# Patient Record
Sex: FEMALE | Employment: FULL TIME | URBAN - METROPOLITAN AREA
[De-identification: names, ages, dates, MRNs, and addresses within clinical notes are randomized per-mention and may not be internally consistent; named-entity substitution may affect disease eponyms.]

---

## 2019-03-26 ENCOUNTER — TELEPHONE (OUTPATIENT)
Dept: SURGERY | Age: 34
End: 2019-03-26

## 2019-03-26 ENCOUNTER — DOCUMENTATION ONLY (OUTPATIENT)
Dept: SURGERY | Age: 34
End: 2019-03-26

## 2019-03-26 ENCOUNTER — OFFICE VISIT (OUTPATIENT)
Dept: SURGERY | Age: 34
End: 2019-03-26

## 2019-03-26 VITALS
HEART RATE: 81 BPM | BODY MASS INDEX: 23.3 KG/M2 | SYSTOLIC BLOOD PRESSURE: 126 MMHG | HEIGHT: 66 IN | WEIGHT: 145 LBS | DIASTOLIC BLOOD PRESSURE: 74 MMHG

## 2019-03-26 DIAGNOSIS — N63.10 BREAST MASS, RIGHT: Primary | ICD-10-CM

## 2019-03-26 RX ORDER — ETONOGESTREL AND ETHINYL ESTRADIOL 11.7; 2.7 MG/1; MG/1
INSERT, EXTENDED RELEASE VAGINAL
COMMUNITY

## 2019-03-26 NOTE — PROGRESS NOTES
[facility]  OFFICE PROCEDURE PROGRESS NOTE        Chart reviewed for the following:   Blade Frey MD, have reviewed the History, Physical and updated the Allergic reactions for Ester performed immediately prior to start of procedure:   Blade Frey MD, have performed the following reviews on [place] prior to the start of the procedure:            * Patient was identified by name and date of birth   * Agreement on procedure being performed was verified  * Risks and Benefits explained to the patient  * Procedure site verified and marked as necessary  * Patient was positioned for comfort  * Consent was signed and verified     Time: 9:45 am      Date of procedure: 3/26/2019    Procedure performed by:  Amanda Torres MD    Provider assisted by:  Naz Chanel RN    Patient assisted by: self    How tolerated by patient: tolerated the procedure well with no complications    Post Procedural Pain Scale: 0 - No Hurt    Comments:   Written and verbal instructions reviewed with and given to patient with her understanding.

## 2019-03-26 NOTE — H&P (VIEW-ONLY)
HISTORY OF PRESENT ILLNESS Shawna Buck is a 34 y.o. female. HPI NEW patient referred by Dr Candido Ballard presents with a RIGHT nipple mass, present for 2 years. The mass occasionally drains clear fluid and gets smaller and then larger. It is painful with pressure. No retraction. OB History Obstetric Comments Menarche 10, LMP 3/5/19, # of children 0, age of 1st delivery n/a, Hysterectomy/oophorectomy no/no, Breast bx no, history of breast feeding no, BCP yes, Hormone therapy no Family history: 
Maternal aunt, breast cancer-diagnosed in her 52's Ultrasound 2 years ago in [place] was normal. 
 
ROS Physical Exam  
Constitutional: She is oriented to person, place, and time. She appears well-developed and well-nourished. Cardiovascular: Normal rate, regular rhythm and normal heart sounds. Pulmonary/Chest: Effort normal and breath sounds normal. Right breast exhibits mass (7 mm mass under the skin of the  medial aspect of the nipple). Right breast exhibits no inverted nipple, no nipple discharge and no skin change. Left breast exhibits no inverted nipple, no mass, no nipple discharge and no skin change. Breasts are symmetrical.  
 
 
Lymphadenopathy:  
  She has no cervical adenopathy. She has no axillary adenopathy. Right: No supraclavicular adenopathy present. Left: No supraclavicular adenopathy present. Neurological: She is alert and oriented to person, place, and time. Skin: Skin is warm and dry. INCISION OF NIPPLE CYST Indication : CYST:  Right nipple Ultrasound Findings: 8mm oval hypoechoic mass with through transmission. Underlying breast tissue is normal, Prep : Alcohol. Anesthesia : Lidocaine plain, 1 cc Procedure:  A 3mm nick was made in the presumed cyst.  No fluid or sebaceous material drain. A solid mass was identified, possibly a papilloma. Dispositiion: will schedule surgical excision History reviewed. No pertinent past medical history. Past Surgical History:  
Procedure Laterality Date  HX HEENT Bilateral   
 LASIK  
 HX WISDOM TEETH EXTRACTION Family History Problem Relation Age of Onset  Breast Cancer Maternal Aunt 50 Social History Tobacco Use  Smoking status: Never Smoker  Smokeless tobacco: Never Used Substance Use Topics  Alcohol use: Yes Frequency: 2-3 times a week Prior to Admission medications Medication Sig Start Date End Date Taking? Authorizing Provider  
ethinyl estradiol-etonogestrel (NUVARING) 0.12-0.015 mg/24 hr vaginal ring by Intravaginal route. Yes Provider, Historical  
  
No Known Allergies ASSESSMENT and PLAN 
  ICD-10-CM ICD-9-CM 1. Breast mass, right N63.10 611.72   
 
RIGHT nipple mass is a solid mass. Will schedule surgical excision. We discussed the possibility that it could interfere with breast feeding. The mass appears to be on the medial aspect of the nipple and hopefully the majority of the nipple and subareolar ducts are lateral to the lesion and will not be injured with surgery.

## 2019-03-26 NOTE — PROGRESS NOTES
HISTORY OF PRESENT ILLNESS  Ale Fernández is a 34 y.o. female. HPI NEW patient referred by Dr Jean Carlos Robertson presents with a RIGHT nipple mass, present for 2 years. The mass occasionally drains clear fluid and gets smaller and then larger. It is painful with pressure. No retraction. OB History   Obstetric Comments   Menarche 10, LMP 3/5/19, # of children 0, age of 1st delivery n/a, Hysterectomy/oophorectomy no/no, Breast bx no, history of breast feeding no, BCP yes, Hormone therapy no     Family history:  Maternal aunt, breast cancer-diagnosed in her 52's    Ultrasound 2 years ago in [place] was normal.    ROS    Physical Exam   Constitutional: She is oriented to person, place, and time. She appears well-developed and well-nourished. Cardiovascular: Normal rate, regular rhythm and normal heart sounds. Pulmonary/Chest: Effort normal and breath sounds normal. Right breast exhibits mass (7 mm mass under the skin of the  medial aspect of the nipple). Right breast exhibits no inverted nipple, no nipple discharge and no skin change. Left breast exhibits no inverted nipple, no mass, no nipple discharge and no skin change. Breasts are symmetrical.       Lymphadenopathy:     She has no cervical adenopathy. She has no axillary adenopathy. Right: No supraclavicular adenopathy present. Left: No supraclavicular adenopathy present. Neurological: She is alert and oriented to person, place, and time. Skin: Skin is warm and dry. INCISION OF NIPPLE CYST  Indication : CYST:  Right nipple   Ultrasound Findings: 8mm oval hypoechoic mass with through transmission. Underlying breast tissue is normal,  Prep : Alcohol. Anesthesia : Lidocaine plain, 1 cc  Procedure:  A 3mm nick was made in the presumed cyst.  No fluid or sebaceous material drain. A solid mass was identified, possibly a papilloma. Dispositiion: will schedule surgical excision  History reviewed. No pertinent past medical history.   Past Surgical History:   Procedure Laterality Date    HX HEENT Bilateral     LASIK    HX WISDOM TEETH EXTRACTION        Family History   Problem Relation Age of Onset    Breast Cancer Maternal Aunt 48     Social History     Tobacco Use    Smoking status: Never Smoker    Smokeless tobacco: Never Used   Substance Use Topics    Alcohol use: Yes     Frequency: 2-3 times a week      Prior to Admission medications    Medication Sig Start Date End Date Taking? Authorizing Provider   ethinyl estradiol-etonogestrel (NUVARING) 0.12-0.015 mg/24 hr vaginal ring by Intravaginal route. Yes Provider, Historical      No Known Allergies    ASSESSMENT and PLAN    ICD-10-CM ICD-9-CM    1. Breast mass, right N63.10 611.72      RIGHT nipple mass is a solid mass. Will schedule surgical excision. We discussed the possibility that it could interfere with breast feeding. The mass appears to be on the medial aspect of the nipple and hopefully the majority of the nipple and subareolar ducts are lateral to the lesion and will not be injured with surgery.

## 2019-03-26 NOTE — PATIENT INSTRUCTIONS

## 2019-03-27 DIAGNOSIS — N63.10 MASS OF BREAST, RIGHT: Primary | ICD-10-CM

## 2019-03-27 RX ORDER — IBUPROFEN 200 MG
200 TABLET ORAL
COMMUNITY

## 2019-03-27 NOTE — TELEPHONE ENCOUNTER
SURGERY SCHEDULED AT [facility] ON 4-3-19 AT 11 AM  PATIENT TO ARRIVE AT 9:30 AM TO REGISTER ON THE  2ND FLOOR; NPO AFTER MIDNIGHT THE NIGHT BEFORE;  NOTHING ON THE SKIN; WILL NEED A . PATIENT HAS BEEN GIVEN INSTRUCTIONS. THE PATIENT LIVES IN [place] AND WILL HAVE   HER POST OP CHECK THERE.

## 2019-03-27 NOTE — PERIOP NOTES
[address]                  
[address] [facility], [id number] [place] [address] MAIN OR                                  [phone] MAIN PRE OP                          [phone]                                                                                AMBULATORY PRE OP          [phone] PRE-ADMISSION TESTING    21  Surgery Date: Wednesday 4/3/19 Is surgery arrival time given by surgeon? Yes - 9:30am 
 
 
INSTRUCTIONS BEFORE YOUR SURGERY When You 
Arrive Arrive at the 2nd [address] [facility] on the day of your surgery Have your insurance card, photo ID, and any copayment (if needed) Food 
 and  
Drink NO food or drink after midnight the night before surgery This means NO water, gum, mints, coffee, juice, etc. 
No alcohol (beer, wine, liquor) 24 hours before and after surgery Medications to TAKE Morning of Surgery MEDICATIONS TO TAKE THE MORNING OF SURGERY WITH A SIP OF WATER:  
? none Medications To 
STOP      7 days before surgery ? Non-Steroidal anti-inflammatory Drugs (NSAID's): for example, Ibuprofen (Advil, Motrin), Naproxen (Aleve) ? Aspirin, if taking for pain ? Herbal supplements, vitamins, and fish oil 
? Other: 
(Pain medications not listed above, including Tylenol may be taken) Blood Thinners ? Bathing Clothing Jewelry Valuables ? If you shower the morning of surgery, please do not apply anything to your skin (lotions, powders, deodorant, or makeup, especially mascara) ? ? Do not shave or trim anywhere 24 hours before surgery ? Wear your hair loose or down; no pony-tails, buns, or metal hair clips ? Wear loose, comfortable, clean clothes ? Wear glasses instead of contacts ? Leave money, valuables, and jewelry, including body piercings, at home Going Home       or Spending the Night ?  SAME-DAY SURGERY: You must have a responsible adult drive you home and stay with you 24 hours after surgery ? ADMITS: If your doctor is keeping you into the hospital after surgery, leave personal belongings/luggage in your car until you have a hospital room number. Hospital discharge time is 12 noon Drivers must be here before 12 noon unless you are told differently Special Instructions Free  parking 7am-5pm 
  
 
 
Follow all instructions so your surgery wont be cancelled. Please, be on time. If a situation occurs and you are delayed the day of surgery, call [phone] or          [phone]. If your physical condition changes (like a fever, cold, flu, etc.) call your surgeon. The patient was contacted  via phone. Home medication reviewed and verified during PAT appointment. The patient verbalizes understanding of all instructions and does not  need reinforcement.

## 2019-04-02 ENCOUNTER — ANESTHESIA EVENT (OUTPATIENT)
Dept: SURGERY | Age: 34
End: 2019-04-02
Payer: COMMERCIAL

## 2019-04-03 ENCOUNTER — HOSPITAL ENCOUNTER (OUTPATIENT)
Age: 34
Setting detail: OUTPATIENT SURGERY
Discharge: HOME | End: 2019-04-03
Attending: SURGERY | Admitting: SURGERY
Payer: COMMERCIAL

## 2019-04-03 ENCOUNTER — ANESTHESIA (OUTPATIENT)
Dept: SURGERY | Age: 34
End: 2019-04-03
Payer: COMMERCIAL

## 2019-04-03 VITALS
BODY MASS INDEX: 23.14 KG/M2 | OXYGEN SATURATION: 100 % | RESPIRATION RATE: 15 BRPM | TEMPERATURE: 97.5 F | HEART RATE: 78 BPM | DIASTOLIC BLOOD PRESSURE: 71 MMHG | HEIGHT: 66 IN | WEIGHT: 143.96 LBS | SYSTOLIC BLOOD PRESSURE: 122 MMHG

## 2019-04-03 DIAGNOSIS — N63.10 MASS OF BREAST, RIGHT: ICD-10-CM

## 2019-04-03 DIAGNOSIS — Z98.890 S/P BREAST BIOPSY: Primary | ICD-10-CM

## 2019-04-03 LAB — HCG UR QL: NEGATIVE

## 2019-04-03 PROCEDURE — 77030031139 HC SUT VCRL2 J&J -A: Performed by: SURGERY

## 2019-04-03 PROCEDURE — 77030039266 HC ADH SKN EXOFIN S2SG -A: Performed by: SURGERY

## 2019-04-03 PROCEDURE — 76210000040 HC AMBSU PH I REC FIRST 0.5 HR: Performed by: SURGERY

## 2019-04-03 PROCEDURE — 74011250636 HC RX REV CODE- 250/636

## 2019-04-03 PROCEDURE — 74011250636 HC RX REV CODE- 250/636: Performed by: ANESTHESIOLOGY

## 2019-04-03 PROCEDURE — 76030000000 HC AMB SURG OR TIME 0.5 TO 1: Performed by: SURGERY

## 2019-04-03 PROCEDURE — 74011000250 HC RX REV CODE- 250: Performed by: SURGERY

## 2019-04-03 PROCEDURE — 76210000050 HC AMBSU PH II REC 0.5 TO 1 HR: Performed by: SURGERY

## 2019-04-03 PROCEDURE — 77030018836 HC SOL IRR NACL ICUM -A: Performed by: SURGERY

## 2019-04-03 PROCEDURE — 77030020782 HC GWN BAIR PAWS FLX 3M -B

## 2019-04-03 PROCEDURE — 77030032490 HC SLV COMPR SCD KNE COVD -B

## 2019-04-03 PROCEDURE — 81025 URINE PREGNANCY TEST: CPT

## 2019-04-03 PROCEDURE — 77030002996 HC SUT SLK J&J -A: Performed by: SURGERY

## 2019-04-03 PROCEDURE — 77030002933 HC SUT MCRYL J&J -A: Performed by: SURGERY

## 2019-04-03 PROCEDURE — 88305 TISSUE EXAM BY PATHOLOGIST: CPT

## 2019-04-03 PROCEDURE — 76060000061 HC AMB SURG ANES 0.5 TO 1 HR: Performed by: SURGERY

## 2019-04-03 RX ORDER — DIPHENHYDRAMINE HYDROCHLORIDE 50 MG/ML
12.5 INJECTION, SOLUTION INTRAMUSCULAR; INTRAVENOUS AS NEEDED
Status: DISCONTINUED | OUTPATIENT
Start: 2019-04-03 | End: 2019-04-03 | Stop reason: HOSPADM

## 2019-04-03 RX ORDER — HYDROMORPHONE HYDROCHLORIDE 1 MG/ML
.25-1 INJECTION, SOLUTION INTRAMUSCULAR; INTRAVENOUS; SUBCUTANEOUS
Status: DISCONTINUED | OUTPATIENT
Start: 2019-04-03 | End: 2019-04-03 | Stop reason: HOSPADM

## 2019-04-03 RX ORDER — BUPIVACAINE HYDROCHLORIDE 5 MG/ML
INJECTION, SOLUTION EPIDURAL; INTRACAUDAL AS NEEDED
Status: DISCONTINUED | OUTPATIENT
Start: 2019-04-03 | End: 2019-04-03 | Stop reason: HOSPADM

## 2019-04-03 RX ORDER — DEXAMETHASONE SODIUM PHOSPHATE 4 MG/ML
INJECTION, SOLUTION INTRA-ARTICULAR; INTRALESIONAL; INTRAMUSCULAR; INTRAVENOUS; SOFT TISSUE AS NEEDED
Status: DISCONTINUED | OUTPATIENT
Start: 2019-04-03 | End: 2019-04-03 | Stop reason: HOSPADM

## 2019-04-03 RX ORDER — FENTANYL CITRATE 50 UG/ML
INJECTION, SOLUTION INTRAMUSCULAR; INTRAVENOUS AS NEEDED
Status: DISCONTINUED | OUTPATIENT
Start: 2019-04-03 | End: 2019-04-03 | Stop reason: HOSPADM

## 2019-04-03 RX ORDER — LIDOCAINE HYDROCHLORIDE 20 MG/ML
INJECTION, SOLUTION INFILTRATION; PERINEURAL AS NEEDED
Status: DISCONTINUED | OUTPATIENT
Start: 2019-04-03 | End: 2019-04-03 | Stop reason: HOSPADM

## 2019-04-03 RX ORDER — MIDAZOLAM HYDROCHLORIDE 1 MG/ML
INJECTION, SOLUTION INTRAMUSCULAR; INTRAVENOUS AS NEEDED
Status: DISCONTINUED | OUTPATIENT
Start: 2019-04-03 | End: 2019-04-03 | Stop reason: HOSPADM

## 2019-04-03 RX ORDER — FLUMAZENIL 0.1 MG/ML
0.2 INJECTION INTRAVENOUS
Status: DISCONTINUED | OUTPATIENT
Start: 2019-04-03 | End: 2019-04-03 | Stop reason: HOSPADM

## 2019-04-03 RX ORDER — SODIUM CHLORIDE 0.9 % (FLUSH) 0.9 %
5-40 SYRINGE (ML) INJECTION EVERY 8 HOURS
Status: DISCONTINUED | OUTPATIENT
Start: 2019-04-03 | End: 2019-04-03 | Stop reason: HOSPADM

## 2019-04-03 RX ORDER — SODIUM CHLORIDE 0.9 % (FLUSH) 0.9 %
5-40 SYRINGE (ML) INJECTION AS NEEDED
Status: DISCONTINUED | OUTPATIENT
Start: 2019-04-03 | End: 2019-04-03 | Stop reason: HOSPADM

## 2019-04-03 RX ORDER — SODIUM CHLORIDE, SODIUM LACTATE, POTASSIUM CHLORIDE, CALCIUM CHLORIDE 600; 310; 30; 20 MG/100ML; MG/100ML; MG/100ML; MG/100ML
125 INJECTION, SOLUTION INTRAVENOUS CONTINUOUS
Status: DISCONTINUED | OUTPATIENT
Start: 2019-04-03 | End: 2019-04-03 | Stop reason: HOSPADM

## 2019-04-03 RX ORDER — HYDROCODONE BITARTRATE AND ACETAMINOPHEN 5; 325 MG/1; MG/1
1 TABLET ORAL
Qty: 10 TAB | Refills: 0 | Status: SHIPPED | OUTPATIENT
Start: 2019-04-03 | End: 2019-04-06

## 2019-04-03 RX ORDER — PROPOFOL 10 MG/ML
INJECTION, EMULSION INTRAVENOUS AS NEEDED
Status: DISCONTINUED | OUTPATIENT
Start: 2019-04-03 | End: 2019-04-03 | Stop reason: HOSPADM

## 2019-04-03 RX ORDER — BUPIVACAINE HYDROCHLORIDE AND EPINEPHRINE 5; 5 MG/ML; UG/ML
30 INJECTION, SOLUTION EPIDURAL; INTRACAUDAL; PERINEURAL
Status: COMPLETED | OUTPATIENT
Start: 2019-04-03 | End: 2019-04-03

## 2019-04-03 RX ORDER — LIDOCAINE HYDROCHLORIDE 10 MG/ML
0.1 INJECTION, SOLUTION EPIDURAL; INFILTRATION; INTRACAUDAL; PERINEURAL AS NEEDED
Status: DISCONTINUED | OUTPATIENT
Start: 2019-04-03 | End: 2019-04-03 | Stop reason: HOSPADM

## 2019-04-03 RX ORDER — NALOXONE HYDROCHLORIDE 0.4 MG/ML
0.04 INJECTION, SOLUTION INTRAMUSCULAR; INTRAVENOUS; SUBCUTANEOUS
Status: DISCONTINUED | OUTPATIENT
Start: 2019-04-03 | End: 2019-04-03 | Stop reason: HOSPADM

## 2019-04-03 RX ORDER — ONDANSETRON 2 MG/ML
INJECTION INTRAMUSCULAR; INTRAVENOUS AS NEEDED
Status: DISCONTINUED | OUTPATIENT
Start: 2019-04-03 | End: 2019-04-03 | Stop reason: HOSPADM

## 2019-04-03 RX ORDER — PROPOFOL 10 MG/ML
INJECTION, EMULSION INTRAVENOUS
Status: DISCONTINUED | OUTPATIENT
Start: 2019-04-03 | End: 2019-04-03 | Stop reason: HOSPADM

## 2019-04-03 RX ADMIN — PROPOFOL 200 MCG/KG/MIN: 10 INJECTION, EMULSION INTRAVENOUS at 11:27

## 2019-04-03 RX ADMIN — SODIUM CHLORIDE, SODIUM LACTATE, POTASSIUM CHLORIDE, AND CALCIUM CHLORIDE 125 ML/HR: 600; 310; 30; 20 INJECTION, SOLUTION INTRAVENOUS at 10:15

## 2019-04-03 RX ADMIN — FENTANYL CITRATE 50 MCG: 50 INJECTION, SOLUTION INTRAMUSCULAR; INTRAVENOUS at 11:25

## 2019-04-03 RX ADMIN — DEXAMETHASONE SODIUM PHOSPHATE 4 MG: 4 INJECTION, SOLUTION INTRA-ARTICULAR; INTRALESIONAL; INTRAMUSCULAR; INTRAVENOUS; SOFT TISSUE at 11:31

## 2019-04-03 RX ADMIN — FENTANYL CITRATE 25 MCG: 50 INJECTION, SOLUTION INTRAMUSCULAR; INTRAVENOUS at 11:36

## 2019-04-03 RX ADMIN — MIDAZOLAM HYDROCHLORIDE 5 MG: 1 INJECTION, SOLUTION INTRAMUSCULAR; INTRAVENOUS at 11:24

## 2019-04-03 RX ADMIN — PROPOFOL 50 MG: 10 INJECTION, EMULSION INTRAVENOUS at 11:30

## 2019-04-03 RX ADMIN — FENTANYL CITRATE 25 MCG: 50 INJECTION, SOLUTION INTRAMUSCULAR; INTRAVENOUS at 11:43

## 2019-04-03 RX ADMIN — LIDOCAINE HYDROCHLORIDE 60 MG: 20 INJECTION, SOLUTION INFILTRATION; PERINEURAL at 11:25

## 2019-04-03 RX ADMIN — ONDANSETRON 4 MG: 2 INJECTION INTRAMUSCULAR; INTRAVENOUS at 11:38

## 2019-04-03 NOTE — DISCHARGE INSTRUCTIONS
Patient Education                         Discharge Instructions from Dr. Belinda Meza    Patient Discharge Senthil Gamez / [id number] : 1985    Admitted 4/3/2019 Discharged: 4/3/2019   What to do at Home  Diet:Regular  Activity: As tolerated. No driving if taking pain medication. Okay to shower or take a bath. You may chose to wear a bra to sleep in for extra support for the next few days. Pain control: Ice pack 20 minutes of every hour until you go to bed tonight. You may use over-the-counter medication as needed (acetominophen, aspirin, ibuprofen). Fill the prescription for hydrocodone if needed. Tomorrow you may put a heat pack on the breast.  Dressing: The skin glue is waterproof. It is okay to wash normally at this site. If the glue is still present after 10 days you should peel it off. Follow up: I will call with results within one week. Problems/Questions: Call Giovanna Ojeda MD on my cell phone. [phone]                 Open Breast Biopsy: Before Your Surgery  What is an open breast biopsy? An open breast biopsy is surgery to take a sample of breast tissue. It may be done to check a lump found during a breast exam. Or it may be done to check an area of concern found on a mammogram or ultrasound. If the doctor can't feel the lump, a small wire can be put in the area during a mammogram or ultrasound just before surgery. The tip of the wire will guide the doctor to the area to be checked. The doctor will make a small cut in the breast to remove a piece of tissue. The cut is called an incision. If the lump or suspicious area is small, the doctor may be able to take out the entire lump or area. The doctor will close the incision with stitches. The breast tissue will be sent to a lab. There it will be examined under a microscope to check for breast cancer. Your doctor may get some answers right away. But it can take up to 1 to 2 weeks to get the final results.   You will be able to go home on the same day as the biopsy. Most women are able to go back to work in 1 or 2 days. This depends on how you feel and the type of work you do. For 2 weeks after surgery, you will need to avoid bouncing and strenuous activities that involve the upper body. The surgery will leave a small scar on your breast that will fade with time. Less often, the surgery may leave a dent in the breast. You may be able to feel a hard area where the biopsy was done. This is a normal part of the healing process. It does not mean that the lump is growing back. The area will get softer in the weeks after surgery. Follow-up care is a key part of your treatment and safety. Be sure to make and go to all appointments, and call your doctor if you are having problems. It's also a good idea to know your test results and keep a list of the medicines you take. What happens before surgery?   Surgery can be stressful. This information will help you understand what you can expect. And it will help you safely prepare for surgery.   Preparing for surgery    · Understand exactly what surgery is planned, along with the risks, benefits, and other options. · Tell your doctors ALL the medicines, vitamins, supplements, and herbal remedies you take. Some of these can increase the risk of bleeding or interact with anesthesia.     · If you take blood thinners, such as warfarin (Coumadin), clopidogrel (Plavix), or aspirin, be sure to talk to your doctor. He or she will tell you if you should stop taking these medicines before your surgery. Make sure that you understand exactly what your doctor wants you to do.     · Your doctor will tell you which medicines to take or stop before your surgery. You may need to stop taking certain medicines a week or more before surgery. So talk to your doctor as soon as you can.     · If you have an advance directive, let your doctor know.  It may include a living will and a durable power of  for health care. Bring a copy to the hospital. If you don't have one, you may want to prepare one. It lets your doctor and loved ones know your health care wishes. Doctors advise that everyone prepare these papers before any type of surgery or procedure. What happens on the day of surgery? · Follow the instructions exactly about when to stop eating and drinking. If you don't, your surgery may be canceled. If your doctor told you to take your medicines on the day of surgery, take them with only a sip of water.     · Take a bath or shower before you come in for your surgery. Do not apply lotions, perfumes, deodorants, or nail polish.     · Do not shave the surgical site yourself.     · Take off all jewelry and piercings. And take out contact lenses, if you wear them.     · Bring a comfortable, supportive bra with you. For the first 3 days after surgery, you will need to wear this all the time, even at night.    At the hospital or surgery center   · Bring a picture ID.     · The area for surgery is often marked to make sure there are no errors. If needed, you will get a mammogram or ultrasound to bartolo the area.     · You will be kept comfortable and safe by your anesthesia provider. The anesthesia may make you sleep. Or it may just numb the area being worked on.     · The surgery will take about 1 hour. Going home   · Be sure you have someone to drive you home. Anesthesia and pain medicine make it unsafe for you to drive.     · You will be given more specific instructions about recovering from your surgery. They will cover things like diet, wound care, follow-up care, driving, and getting back to your normal routine. When should you call your doctor? · You have questions or concerns.     · You don't understand how to prepare for your surgery.     · You become ill before the surgery (such as fever, flu, or a cold).     · You need to reschedule or have changed your mind about having the surgery. Where can you learn more? Go to http://alexis-nimesh.info/. Enter F884 in the search box to learn more about \"Open Breast Biopsy: Before Your Surgery. \"  Current as of: March 27, 2018  Content Version: 11.9  © 9127-2207 [employer]. Care instructions adapted under license by [employer] (which disclaims liability or warranty for this information). If you have questions about a medical condition or this instruction, always ask your healthcare professional. [address] any warranty or liability for your use of this information. DISCHARGE SUMMARY from your Nurse      PATIENT INSTRUCTIONS    After general anesthesia or intravenous sedation, for 24 hours or while taking prescription Narcotics:  · Limit your activities  · Do not drive and operate hazardous machinery  · Do not make important personal or business decisions  · Do  not drink alcoholic beverages  · If you have not urinated within 8 hours after discharge, please contact your surgeon on call. Report the following to your surgeon:  · Excessive pain, swelling, redness or odor of or around the surgical area  · Temperature over 100.5  · Nausea and vomiting lasting longer than 4 hours or if unable to take medications  · Any signs of decreased circulation or nerve impairment to extremity: change in color, persistent  numbness, tingling, coldness or increase pain  · Any questions      COUGH AND DEEP BREATHE    Breathing deeply and coughing are very important exercises to do after surgery. Deep breathing and coughing open the little air tubes and air sacks in your lungs. You take deep breaths every day. You may not even notice - it is just something you do when you sigh or yawn. It is a natural exercise you do to keep these air passages open. After surgery, take deep breaths and cough, on purpose. DIRECTIONS:  · Take 10 to 15 slow deep breaths every hour while awake.   · Breathe in deeply, and hold it for 2 seconds. · Exhale slowly through puckered lips, like blowing up a balloon. · After every 4th or 5th deep breath, hug your pillow to your chest or belly and give a hard, deep cough. Yes, it will probably hurt. But doing this exercise is a very important part of healing after surgery. Take your pain medicine to help you do this exercise without too much pain. Coughing and deep breathing help prevent bronchitis and pneumonia after surgery. If you had chest or belly surgery, use a pillow as a \"hug mary ann\" and hold it tightly to your chest or belly when you cough. ANKLE PUMPS    Ankle pumps increase the circulation of oxygenated blood to your lower extremities and decrease your risk for circulation problems such as blood clots. They also stretch the muscles, tendons and ligaments in your foot and ankle, and prevent joint contracture in the ankle and foot, especially after surgeries on the legs. It is important to do ankle pump exercises regularly after surgery because immobility increases your risk for developing a blood clot. Your doctor may also have you take an Aspirin for the next few days as well. If your doctor did not ask you to take an Aspirin, consult with him before starting Aspirin therapy on your own. The exercise is quite simple. · Slowly point your foot forward, feeling the muscles on the top of your lower leg stretch, and hold this position for 5 seconds. · Next, pull your foot back toward you as far as possible, stretching the calf muscles, and hold that position for 5 seconds. · Repeat with the other foot. · Perform 10 repetitions every hour while awake for both ankles if possible (down and then up with the foot once is one repetition). You should feel gentle stretching of the muscles in your lower leg when doing this exercise. If you feel pain, or your range of motion is limited, don't push too hard.   Only go the limit your joint and muscles will let you go. If you have increasing pain, progressively worsening leg warmth or swelling, STOP the exercise and call your doctor. MEDICATION AND   SIDE EFFECT GUIDE    The [facility] Insurance MEDICATION AND SIDE EFFECT GUIDE was provided to the PATIENT AND CARE PROVIDER. Information provided includes instruction about drug purpose and common side effects for the following medications:   · Hydrocodone-acetaminophen  · ibuprofen        These are general instructions for a healthy lifestyle:    *   Please give a list of your current medications to your Primary Care Provider. *   Please update this list whenever your medications are discontinued, doses are changed, or new medications (including over-the-counter products) are added. *   Please carry medication information at all times in case of emergency situations. About Smoking  No smoking / No tobacco products  Avoid exposure to second hand smoke     Surgeon General's Warning:  Quitting smoking now greatly reduces serious risk to your health. Obesity, smoking, and sedentary lifestyle greatly increases your risk for illness and disease. A healthy diet, regular physical exercise & weight monitoring are important for maintaining a healthy lifestyle. Congestive Heart Failure  You may be retaining fluid if you have a history of heart failure or if you experience any of the following symptoms:  Weight gain of 3 pounds or more overnight or 5 pounds in a week, increased swelling in your hands or feet or shortness of breath while lying flat in bed. Please call your doctor as soon as you notice any of these symptoms; do not wait until your next office visit.       Recognize signs and symptoms of STROKE:  F -  Face looks uneven  A -  Arms unable to move or move evenly  S -  Speech slurred or non-existent  T -  Time-call 911 as soon as signs and symptoms begin-DO NOT go          back to bed or wait to see if you get better-TIME IS BRAIN. Warning Signs of HEART ATTACK   Call 911 if you have these symptoms:     Chest discomfort. Most heart attacks involve discomfort in the center of the chest that lasts more than a few minutes, or that goes away and comes back. It can feel like uncomfortable pressure, squeezing, fullness, or pain.  Discomfort in other areas of the upper body. Symptoms can include pain or discomfort in one or both arms, the back, neck, jaw, or stomach.  Shortness of breath with or without chest discomfort.  Other signs may include breaking out in a cold sweat, nausea, or lightheadedness. Don't wait more than five minutes to call 911 - MINUTES MATTER! Fast action can save your life. Calling 911 is almost always the fastest way to get lifesaving treatment. Emergency Medical Services staff can begin treatment when they arrive -- up to an hour sooner than if someone gets to the hospital by car. The discharge information has been reviewed with the patient and caregiver. Any questions and concerns from the patient and parent have been addressed. The patient and parent verbalized understanding. Other information in your discharge envelope:  [x]     PRESCRIPTIONS  []     PHYSICAL THERAPY PRESCRIPTION  []     APPOINTMENT CARDS  []     Regional Anesthesia Pamphlet for block or block with On-Q Catheter from   Anesthesia Service  []     Medical device information sheets/pamphlets from their    []     School/work excuse note. []     /parent work excuse note. The following personal items collected during your admission are returned to you:   Dental Appliance: Dental Appliances: None  Vision: Visual Aid: Glasses  Hearing Aid:    Jewelry: Jewelry: None  Clothing: Clothing: Footwear, Pants, Shirt, Undergarments  Other Valuables:  Other Valuables: Eyeglasses  Valuables sent to safe: Personal Items Sent to Safe: N/A

## 2019-04-03 NOTE — ANESTHESIA PREPROCEDURE EVALUATION
Relevant Problems No relevant active problems Anesthetic History No history of anesthetic complications Review of Systems / Medical History Patient summary reviewed, nursing notes reviewed and pertinent labs reviewed Pulmonary Within defined limits Neuro/Psych Within defined limits Cardiovascular Exercise tolerance: >4 METS 
  
GI/Hepatic/Renal 
Within defined limits Endo/Other Within defined limits Other Findings Comments: Right nipple mass Physical Exam 
 
Airway Mallampati: II 
TM Distance: 4 - 6 cm Neck ROM: normal range of motion Cardiovascular Rhythm: regular Rate: normal 
 
 
 
 Dental 
No notable dental hx Pulmonary Breath sounds clear to auscultation Abdominal 
 
 
 
 Other Findings Anesthetic Plan ASA: 1 Anesthesia type: MAC Induction: Intravenous Anesthetic plan and risks discussed with: Patient

## 2019-04-03 NOTE — ANESTHESIA POSTPROCEDURE EVALUATION
Procedure(s): RIGHT BREAST NIPPLE EXCISIONAL BIOPSY. MAC Anesthesia Post Evaluation Multimodal analgesia: multimodal analgesia used between 6 hours prior to anesthesia start to PACU discharge Patient location during evaluation: bedside Patient participation: complete - patient participated Level of consciousness: awake Pain management: adequate Airway patency: patent Anesthetic complications: no 
Cardiovascular status: acceptable Respiratory status: acceptable Hydration status: acceptable Post anesthesia nausea and vomiting:  controlled Vitals Value Taken Time /52 4/3/2019 12:10 PM  
Temp 36.4 °C (97.5 °F) 4/3/2019 12:10 PM  
Pulse 72 4/3/2019 12:15 PM  
Resp 15 4/3/2019 12:15 PM  
SpO2 99 % 4/3/2019 12:15 PM  
Vitals shown include unvalidated device data.

## 2019-04-03 NOTE — INTERVAL H&P NOTE
H&P Update: 
[place] was seen and examined. History and physical has been reviewed. The patient has been examined.  There have been no significant clinical changes since the completion of the originally dated History and Physical. 
 
Signed By: Ld Perea MD   
 April 3, 2019 10:52 AM

## 2019-04-03 NOTE — OP NOTES
[facility] [address] [facility] [address]    Name: Remington León  : 1985    Breast Biopsy   Operative Report    Date of Surgery:4/3/2019  Preoperative Diagnosis: RIGHT nipple mass  Postoperative Diagnosis: same  Surgeon: Dr Walter Luna  Anesthesia: MAC  Procedure:  Excision of mass from the RIGHT nipple   Indication: RIGHT nipple mass  Procedure in Detail:  The patient was sedated with IV sedation. The right breast was prepped and draped in the usual sterile manner. The mass was in the RIGHT nipple. I incised it last week in the office and the skin retracted from the mass and it has been bleeding since that time. The skin was excoriated from bandaids. Marcaine with and without epinephrine was used for pain control and to decrease bleeding. The mass was excised using blunt dissection. The mass grossly appeared to be a papilloma. It was friable. It was removed and sent to pathology. Hemostasis was controlled with electrocautery and pressure and marcaine with epinephrine. There was some oozing from the skin  The skin that had been stretched by the growth of the mass was debrided. The skin was re-approximated with interupted 4-0 Monocryl suture. The wound was dressed with skin glue. A gauze bolster was placed for pressure to decrease post op hematoma formation, and this was held in place with paper tape. The patient was awakened and taken to the recovery room. Sponge, needle and instrument counts were reported to be correct.     Findings:  Mass in the nipple    Estimated Blood Loss:  10 ml           Specimens:   ID Type Source Tests Collected by Time Destination   1 : Right breast nipple biopsy Preservative Breast  Pia Andino MD 4/3/2019 1142 Pathology      Complications: none  Implants: none      Signed By: Perla Lopez MD

## 2019-04-04 ENCOUNTER — TELEPHONE (OUTPATIENT)
Dept: SURGERY | Age: 34
End: 2019-04-04

## 2019-04-08 NOTE — TELEPHONE ENCOUNTER
4/7/2019   Called patient  Breast, right nipple, biopsy:       Nipple adenoma  She is doing well  PRN follow up

## (undated) DEVICE — SUTURE MCRYL SZ 4-0 L27IN ABSRB UD L19MM PS-2 1/2 CIR PRIM Y426H

## (undated) DEVICE — SUT SLK 2-0SH 30IN BLK --

## (undated) DEVICE — CHEST PACK: Brand: MEDLINE INDUSTRIES, INC.

## (undated) DEVICE — ROCKER SWITCH PENCIL BLADE ELECTRODE, HOLSTER: Brand: EDGE

## (undated) DEVICE — STERILE POLYISOPRENE POWDER-FREE SURGICAL GLOVES: Brand: PROTEXIS

## (undated) DEVICE — SUTURE VCRL SZ 3-0 L27IN ABSRB UD L26MM SH 1/2 CIR J416H

## (undated) DEVICE — LIGHT HANDLE: Brand: DEVON

## (undated) DEVICE — DEVON™ KNEE AND BODY STRAP 60" X 3" (1.5 M X 7.6 CM): Brand: DEVON

## (undated) DEVICE — APPLICATOR BNDG 1MM ADH PREMIERPRO EXOFIN

## (undated) DEVICE — SOL IRRIGATION INJ NACL 0.9% 500ML BTL

## (undated) DEVICE — NEEDLE HYPO 25GA L1.5IN BVL ORIENTED ECLIPSE

## (undated) DEVICE — Z INACTIVE USE 2653177 SPONGE GZ W2XL2IN NONWOVEN 4 PLY FASTER WICKING ABIL AVANT

## (undated) DEVICE — INFECTION CONTROL KIT SYS